# Patient Record
Sex: MALE | Race: WHITE | NOT HISPANIC OR LATINO | Employment: FULL TIME | URBAN - METROPOLITAN AREA
[De-identification: names, ages, dates, MRNs, and addresses within clinical notes are randomized per-mention and may not be internally consistent; named-entity substitution may affect disease eponyms.]

---

## 2017-06-26 PROBLEM — K21.9 GASTROESOPHAGEAL REFLUX DISEASE: Status: ACTIVE | Noted: 2017-06-26

## 2017-06-26 RX ORDER — SUMATRIPTAN 100 MG/1
100 TABLET, FILM COATED ORAL ONCE AS NEEDED
COMMUNITY
Start: 2015-01-08

## 2017-06-26 RX ORDER — FLUTICASONE PROPIONATE 50 MCG
1 SPRAY, SUSPENSION (ML) NASAL DAILY
COMMUNITY
Start: 2015-03-30 | End: 2018-03-14 | Stop reason: HOSPADM

## 2017-06-26 RX ORDER — ESOMEPRAZOLE MAGNESIUM 40 MG/1
40 CAPSULE, DELAYED RELEASE ORAL
COMMUNITY
Start: 2016-06-07 | End: 2018-03-14 | Stop reason: HOSPADM

## 2017-06-26 RX ORDER — IBUPROFEN 800 MG/1
800 TABLET ORAL
COMMUNITY
Start: 2015-01-08 | End: 2018-02-13

## 2018-02-13 ENCOUNTER — OFFICE VISIT (OUTPATIENT)
Dept: SURGERY | Facility: CLINIC | Age: 33
End: 2018-02-13

## 2018-02-13 VITALS
SYSTOLIC BLOOD PRESSURE: 140 MMHG | BODY MASS INDEX: 31.11 KG/M2 | OXYGEN SATURATION: 99 % | DIASTOLIC BLOOD PRESSURE: 86 MMHG | HEART RATE: 98 BPM | HEIGHT: 73 IN | TEMPERATURE: 98.2 F | WEIGHT: 234.7 LBS

## 2018-02-13 DIAGNOSIS — K62.5 RECTAL BLEEDING: Primary | ICD-10-CM

## 2018-02-13 DIAGNOSIS — K64.8 INTERNAL HEMORRHOIDS WITH COMPLICATION: ICD-10-CM

## 2018-02-13 PROCEDURE — 46600 DIAGNOSTIC ANOSCOPY SPX: CPT | Performed by: COLON & RECTAL SURGERY

## 2018-02-13 PROCEDURE — 99204 OFFICE O/P NEW MOD 45 MIN: CPT | Performed by: COLON & RECTAL SURGERY

## 2018-02-13 RX ORDER — ALPRAZOLAM 1 MG/1
1 TABLET, EXTENDED RELEASE ORAL 3 TIMES DAILY
COMMUNITY
Start: 2018-01-30

## 2018-02-13 NOTE — PROGRESS NOTES
Sameer De La Rosa is a 32 y.o. male who is seen for Rectal Bleeding (SEEN IN ER).      HPI:    Pt c/o bleeding hemorrhoids since 2016  Bleeding is getting worse: he has blood dripping in the toilet    He has been taking metamucil since high school for hard stools  If he takes 1 day off of metamucil, he has a flare of blood    This past weekend he doubled up metamucil to bid, and has not seen blood since last week    He does not take any stool softeners    He used hemorrhoid foam for 3 days, but he did not notice an improvement    He also c/o occasional LLQ abdominal cramping pain  If he pushes on abd, it makes him pas gas    His weight has fluctuated some recently, which he attributes to working out    He has never had a colonoscopy    FamHx: colon cancer MGF    He does heavy lifting in his job    Past Medical History:   Diagnosis Date   • Abdominal pain    • Acid reflux     INTERMITTENTLY   • Acute sinusitis    • Allergic rhinitis     seasonal   • Anxiety    • Asthmatic bronchitis    • Blood in stool    • 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   • Chest pain    • Chronic ethmoidal sinusitis 12/08/2015   • Chronic rhinitis 12/08/2015   • Deviated nasal septum 12/08/2015   • Dizziness 07/08/2014    SEEN AT [facility] ER   • Epididymitis, left 09/23/2013   • Fatigue    • Heart palpitations    • History of migraine     PRIOR TO SINUS SURGERY   • History of MRSA infection APPROX 2012    WOUND, LEG    • Hx of lead exposure 03/2015   • Insomnia    • Laceration of wrist, left 06/24/2013   • Lymphadenopathy, submental    • Nasal turbinate hypertrophy    • Palpitations    • Pneumonia due to organism    • Rectal bleeding 06/07/2016   • Sensorineural hearing loss 12/08/2015   • Severe epistaxis 01/11/2016    SEEN AT [facility] ER   • Shortness of breath    • TMJ (sprain of temporomandibular joint) 10/23/2012   • Tympanic membrane perforation     LEFT   • Wheezing        Past Surgical History:   Procedure Laterality Date    • APPENDECTOMY N/A 2011   • SEPTOPLASTY Bilateral 12/09/2015    VENITA SMR, INF. TURBINECTOMY, VENITA FESS, SINUSOTOMY DR. DERIC ARGUELLO   • TYMPANOPLASTY Left 5/11/2016    Procedure: LEFT TYMPANOPLASTY;  Surgeon: Deric Arguello MD;  Location: [facility] OR [facility] [place];  Service:        Social History:   reports that he has been smoking Cigarettes.  He has never used smokeless tobacco. He reports that he drinks about 18.0 oz of alcohol per week  He reports that he does not use illicit drugs.      Marriage status:     Family History   Problem Relation Age of Onset   • Colon cancer Maternal Grandfather    • No Known Problems Mother    • No Known Problems Father    • No Known Problems Daughter    • No Known Problems Daughter    • Hyperlipidemia Neg Hx    • Hypertension Neg Hx    • Heart disease Neg Hx    • Heart attack Neg Hx    • Heart failure Neg Hx    • Arrhythmia Neg Hx          Current Outpatient Prescriptions:   •  ALPRAZolam XR (XANAX XR) 1 MG 24 hr tablet, , Disp: , Rfl:   •  esomeprazole (nexIUM) 40 MG capsule, Take 40 mg by mouth., Disp: , Rfl:   •  fluticasone (FLONASE) 50 MCG/ACT nasal spray, 1 spray into each nostril., Disp: , Rfl:   •  psyllium (METAMUCIL) 0.52 G capsule, Take 0.52 g by mouth., Disp: , Rfl:   •  SUMAtriptan (IMITREX) 100 MG tablet, Take 100 mg by mouth., Disp: , Rfl:     Allergy  Review of patient's allergies indicates no known allergies.    Review of Systems   Constitution: Negative for decreased appetite, weakness and weight gain.   HENT: Negative for congestion, hearing loss and hoarse voice.    Eyes: Negative for blurred vision, discharge and visual disturbance.   Cardiovascular: Negative for chest pain, cyanosis and leg swelling.   Respiratory: Negative for cough, shortness of breath, sleep disturbances due to breathing and snoring.    Endocrine: Negative for cold intolerance and heat intolerance.   Hematologic/Lymphatic: Does not bruise/bleed easily.   Skin: Negative for itching, poor  wound healing and skin cancer.   Musculoskeletal: Negative for arthritis, back pain, joint pain and joint swelling.   Gastrointestinal: Positive for abdominal pain. Negative for change in bowel habit, bowel incontinence and constipation.   Genitourinary: Negative for bladder incontinence, dysuria and hematuria.   Neurological: Negative for brief paralysis, excessive daytime sleepiness, dizziness, focal weakness, headaches and light-headedness.   Psychiatric/Behavioral: Negative for altered mental status and hallucinations. The patient does not have insomnia.    Allergic/Immunologic: Negative for HIV exposure and persistent infections.   All other systems reviewed and are negative.      Vitals:    02/13/18 0953   BP: 140/86   Pulse: 98   Temp: 98.2 °F (36.8 °C)   SpO2: 99%     Body mass index is 30.96 kg/(m^2).    Physical Exam   Constitutional: He is oriented to person, place, and time. He appears well-developed and well-nourished. No distress.   HENT:   Head: Normocephalic and atraumatic.   Nose: Nose normal.   Mouth/Throat: Oropharynx is clear and moist.   Eyes: Conjunctivae and EOM are normal. Pupils are equal, round, and reactive to light.   Neck: Normal range of motion. No tracheal deviation present.   Pulmonary/Chest: Effort normal and breath sounds normal. No respiratory distress.   Abdominal: Soft. Bowel sounds are normal. He exhibits no distension.   Genitourinary:   Genitourinary Comments: Perianal exam: external hem - wnl  ANDRESSA- good tone, no masses  Anoscopy performed:  Grade 1 x 3 internal hem   Musculoskeletal: Normal range of motion. He exhibits no edema or deformity.   Neurological: He is alert and oriented to person, place, and time. No cranial nerve deficit. Coordination and gait normal.   Skin: Skin is warm and dry.   Psychiatric: He has a normal mood and affect. His behavior is normal. Judgment normal.       Review of Medical Record: PCP Melissa Horvath records: rectal bleeding  Assessment:  1.  Rectal bleeding    2. Internal hemorrhoids with complication        Plan:    For the rectal bleeding, I recommend colonoscopy to rule out major sources of bleeding other than hemorrhoids. At the time of colonoscopy if there are no serious issues found at that time, I recommend doing rubber band ligation of the enlarged internal hemorrhoids.  I described risk, benefits and alternatives to the patient.  I described to patient typical post procedure recovery, typical outcomes, and 85% success rate. The patient wishes to proceed.    I also recommended he continue his bid fiber regimen.  He can also use miralax as needed for hard stools.      Scribed for Prema Rosa MD by Dora Manjarrez PA-C 2/13/2018  This patient was evaluated by me, recommendations made, documentation reviewed, edited, and revised by me, Prema Rosa MD

## 2018-03-14 ENCOUNTER — ANESTHESIA EVENT (OUTPATIENT)
Dept: GASTROENTEROLOGY | Facility: HOSPITAL | Age: 33
End: 2018-03-14

## 2018-03-14 ENCOUNTER — HOSPITAL ENCOUNTER (OUTPATIENT)
Facility: HOSPITAL | Age: 33
Setting detail: HOSPITAL OUTPATIENT SURGERY
Discharge: HOME | End: 2018-03-14
Attending: COLON & RECTAL SURGERY | Admitting: COLON & RECTAL SURGERY

## 2018-03-14 ENCOUNTER — ANESTHESIA (OUTPATIENT)
Dept: GASTROENTEROLOGY | Facility: HOSPITAL | Age: 33
End: 2018-03-14

## 2018-03-14 VITALS
TEMPERATURE: 97.9 F | HEART RATE: 69 BPM | OXYGEN SATURATION: 98 % | DIASTOLIC BLOOD PRESSURE: 83 MMHG | BODY MASS INDEX: 29.89 KG/M2 | RESPIRATION RATE: 16 BRPM | HEIGHT: 73 IN | SYSTOLIC BLOOD PRESSURE: 129 MMHG | WEIGHT: 225.56 LBS

## 2018-03-14 DIAGNOSIS — K62.5 RECTAL BLEEDING: ICD-10-CM

## 2018-03-14 PROCEDURE — 25010000002 PROPOFOL 10 MG/ML EMULSION: Performed by: ANESTHESIOLOGY

## 2018-03-14 PROCEDURE — 45378 DIAGNOSTIC COLONOSCOPY: CPT | Performed by: COLON & RECTAL SURGERY

## 2018-03-14 RX ORDER — PROPOFOL 10 MG/ML
VIAL (ML) INTRAVENOUS CONTINUOUS PRN
Status: DISCONTINUED | OUTPATIENT
Start: 2018-03-14 | End: 2018-03-14 | Stop reason: SURG

## 2018-03-14 RX ORDER — SODIUM CHLORIDE, SODIUM LACTATE, POTASSIUM CHLORIDE, CALCIUM CHLORIDE 600; 310; 30; 20 MG/100ML; MG/100ML; MG/100ML; MG/100ML
30 INJECTION, SOLUTION INTRAVENOUS CONTINUOUS PRN
Status: DISCONTINUED | OUTPATIENT
Start: 2018-03-14 | End: 2018-03-14 | Stop reason: HOSPADM

## 2018-03-14 RX ORDER — PROPOFOL 10 MG/ML
VIAL (ML) INTRAVENOUS AS NEEDED
Status: DISCONTINUED | OUTPATIENT
Start: 2018-03-14 | End: 2018-03-14 | Stop reason: SURG

## 2018-03-14 RX ORDER — LIDOCAINE HYDROCHLORIDE 20 MG/ML
INJECTION, SOLUTION INFILTRATION; PERINEURAL AS NEEDED
Status: DISCONTINUED | OUTPATIENT
Start: 2018-03-14 | End: 2018-03-14 | Stop reason: SURG

## 2018-03-14 RX ADMIN — PROPOFOL 50 MG: 10 INJECTION, EMULSION INTRAVENOUS at 07:40

## 2018-03-14 RX ADMIN — LIDOCAINE HYDROCHLORIDE 60 MG: 20 INJECTION, SOLUTION INFILTRATION; PERINEURAL at 07:33

## 2018-03-14 RX ADMIN — PROPOFOL 100 MG: 10 INJECTION, EMULSION INTRAVENOUS at 07:35

## 2018-03-14 RX ADMIN — PROPOFOL 100 MCG/KG/MIN: 10 INJECTION, EMULSION INTRAVENOUS at 07:30

## 2018-03-14 RX ADMIN — PROPOFOL 100 MG: 10 INJECTION, EMULSION INTRAVENOUS at 07:30

## 2018-03-14 RX ADMIN — SODIUM CHLORIDE, POTASSIUM CHLORIDE, SODIUM LACTATE AND CALCIUM CHLORIDE: 600; 310; 30; 20 INJECTION, SOLUTION INTRAVENOUS at 07:30

## 2018-03-14 NOTE — ANESTHESIA POSTPROCEDURE EVALUATION
"Patient: Sameer De La Rosa    Procedure Summary     Date:  03/14/18 Room / Location:  [facility] ENDOSCOPY 9 /  [facility]    Anesthesia Start:  0733 Anesthesia Stop:  0756    Procedure:  COLONOSCOPY to cecum and t.i. (N/A ) Diagnosis:       Rectal bleeding      (Rectal bleeding [K62.5])    Surgeon:  Prema Rosa MD Provider:  Herb Hernandez MD    Anesthesia Type:  MAC ASA Status:  2          Anesthesia Type: MAC  Last vitals  BP   114/70 (03/14/18 0805)   Temp   36.6 °C (97.9 °F) (03/14/18 0805)   Pulse   71 (03/14/18 0805)   Resp   16 (03/14/18 0713)     SpO2   97 % (03/14/18 0805)     Post Anesthesia Care and Evaluation    Patient location during evaluation: PACU  Patient participation: complete - patient participated  Level of consciousness: awake  Pain score: 0  Pain management: adequate  Airway patency: patent  Anesthetic complications: No anesthetic complications  PONV Status: none  Cardiovascular status: acceptable  Respiratory status: acceptable  Hydration status: acceptable    Comments: /70 (BP Location: Left arm, Patient Position: Lying)   Pulse 71   Temp 36.6 °C (97.9 °F) (Oral)   Resp 16   Ht 185.4 cm (73\")   Wt 102 kg (225 lb 9 oz)   SpO2 97%   BMI 29.76 kg/m²       "

## 2018-03-14 NOTE — H&P (VIEW-ONLY)
Sameer De La Rosa is a 32 y.o. male who is seen for Rectal Bleeding (SEEN IN ER).      HPI:    Pt c/o bleeding hemorrhoids since 2016  Bleeding is getting worse: he has blood dripping in the toilet    He has been taking metamucil since high school for hard stools  If he takes 1 day off of metamucil, he has a flare of blood    This past weekend he doubled up metamucil to bid, and has not seen blood since last week    He does not take any stool softeners    He used hemorrhoid foam for 3 days, but he did not notice an improvement    He also c/o occasional LLQ abdominal cramping pain  If he pushes on abd, it makes him pas gas    His weight has fluctuated some recently, which he attributes to working out    He has never had a colonoscopy    FamHx: colon cancer MGF    He does heavy lifting in his job    Past Medical History:   Diagnosis Date   • Abdominal pain    • Acid reflux     INTERMITTENTLY   • Acute sinusitis    • Allergic rhinitis     seasonal   • Anxiety    • Asthmatic bronchitis    • Blood in stool    • 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   • Chest pain    • Chronic ethmoidal sinusitis 12/08/2015   • Chronic rhinitis 12/08/2015   • Deviated nasal septum 12/08/2015   • Dizziness 07/08/2014    SEEN AT [facility] ER   • Epididymitis, left 09/23/2013   • Fatigue    • Heart palpitations    • History of migraine     PRIOR TO SINUS SURGERY   • History of MRSA infection APPROX 2012    WOUND, LEG    • Hx of lead exposure 03/2015   • Insomnia    • Laceration of wrist, left 06/24/2013   • Lymphadenopathy, submental    • Nasal turbinate hypertrophy    • Palpitations    • Pneumonia due to organism    • Rectal bleeding 06/07/2016   • Sensorineural hearing loss 12/08/2015   • Severe epistaxis 01/11/2016    SEEN AT [facility] ER   • Shortness of breath    • TMJ (sprain of temporomandibular joint) 10/23/2012   • Tympanic membrane perforation     LEFT   • Wheezing        Past Surgical History:   Procedure Laterality Date    • APPENDECTOMY N/A 2011   • SEPTOPLASTY Bilateral 12/09/2015    VENITA SMR, INF. TURBINECTOMY, VENITA FESS, SINUSOTOMY DR. DERIC ARGUELLO   • TYMPANOPLASTY Left 5/11/2016    Procedure: LEFT TYMPANOPLASTY;  Surgeon: Deric Arguello MD;  Location: [facility] OR [facility] [place];  Service:        Social History:   reports that he has been smoking Cigarettes.  He has never used smokeless tobacco. He reports that he drinks about 18.0 oz of alcohol per week  He reports that he does not use illicit drugs.      Marriage status:     Family History   Problem Relation Age of Onset   • Colon cancer Maternal Grandfather    • No Known Problems Mother    • No Known Problems Father    • No Known Problems Daughter    • No Known Problems Daughter    • Hyperlipidemia Neg Hx    • Hypertension Neg Hx    • Heart disease Neg Hx    • Heart attack Neg Hx    • Heart failure Neg Hx    • Arrhythmia Neg Hx          Current Outpatient Prescriptions:   •  ALPRAZolam XR (XANAX XR) 1 MG 24 hr tablet, , Disp: , Rfl:   •  esomeprazole (nexIUM) 40 MG capsule, Take 40 mg by mouth., Disp: , Rfl:   •  fluticasone (FLONASE) 50 MCG/ACT nasal spray, 1 spray into each nostril., Disp: , Rfl:   •  psyllium (METAMUCIL) 0.52 G capsule, Take 0.52 g by mouth., Disp: , Rfl:   •  SUMAtriptan (IMITREX) 100 MG tablet, Take 100 mg by mouth., Disp: , Rfl:     Allergy  Review of patient's allergies indicates no known allergies.    Review of Systems   Constitution: Negative for decreased appetite, weakness and weight gain.   HENT: Negative for congestion, hearing loss and hoarse voice.    Eyes: Negative for blurred vision, discharge and visual disturbance.   Cardiovascular: Negative for chest pain, cyanosis and leg swelling.   Respiratory: Negative for cough, shortness of breath, sleep disturbances due to breathing and snoring.    Endocrine: Negative for cold intolerance and heat intolerance.   Hematologic/Lymphatic: Does not bruise/bleed easily.   Skin: Negative for itching, poor  wound healing and skin cancer.   Musculoskeletal: Negative for arthritis, back pain, joint pain and joint swelling.   Gastrointestinal: Positive for abdominal pain. Negative for change in bowel habit, bowel incontinence and constipation.   Genitourinary: Negative for bladder incontinence, dysuria and hematuria.   Neurological: Negative for brief paralysis, excessive daytime sleepiness, dizziness, focal weakness, headaches and light-headedness.   Psychiatric/Behavioral: Negative for altered mental status and hallucinations. The patient does not have insomnia.    Allergic/Immunologic: Negative for HIV exposure and persistent infections.   All other systems reviewed and are negative.      Vitals:    02/13/18 0953   BP: 140/86   Pulse: 98   Temp: 98.2 °F (36.8 °C)   SpO2: 99%     Body mass index is 30.96 kg/(m^2).    Physical Exam   Constitutional: He is oriented to person, place, and time. He appears well-developed and well-nourished. No distress.   HENT:   Head: Normocephalic and atraumatic.   Nose: Nose normal.   Mouth/Throat: Oropharynx is clear and moist.   Eyes: Conjunctivae and EOM are normal. Pupils are equal, round, and reactive to light.   Neck: Normal range of motion. No tracheal deviation present.   Pulmonary/Chest: Effort normal and breath sounds normal. No respiratory distress.   Abdominal: Soft. Bowel sounds are normal. He exhibits no distension.   Genitourinary:   Genitourinary Comments: Perianal exam: external hem - wnl  ANDRESSA- good tone, no masses  Anoscopy performed:  Grade 1 x 3 internal hem   Musculoskeletal: Normal range of motion. He exhibits no edema or deformity.   Neurological: He is alert and oriented to person, place, and time. No cranial nerve deficit. Coordination and gait normal.   Skin: Skin is warm and dry.   Psychiatric: He has a normal mood and affect. His behavior is normal. Judgment normal.       Review of Medical Record: PCP Melissa Horvath records: rectal bleeding  Assessment:  1.  Rectal bleeding    2. Internal hemorrhoids with complication        Plan:    For the rectal bleeding, I recommend colonoscopy to rule out major sources of bleeding other than hemorrhoids. At the time of colonoscopy if there are no serious issues found at that time, I recommend doing rubber band ligation of the enlarged internal hemorrhoids.  I described risk, benefits and alternatives to the patient.  I described to patient typical post procedure recovery, typical outcomes, and 85% success rate. The patient wishes to proceed.    I also recommended he continue his bid fiber regimen.  He can also use miralax as needed for hard stools.      Scribed for Prema Rosa MD by Dora Manjarrez PA-C 2/13/2018  This patient was evaluated by me, recommendations made, documentation reviewed, edited, and revised by me, Prema Rosa MD

## 2024-12-18 ENCOUNTER — APPOINTMENT (OUTPATIENT)
Dept: GENERAL RADIOLOGY | Facility: HOSPITAL | Age: 39
End: 2024-12-18
Payer: COMMERCIAL

## 2024-12-18 ENCOUNTER — HOSPITAL ENCOUNTER (EMERGENCY)
Facility: HOSPITAL | Age: 39
Discharge: HOME | End: 2024-12-18
Attending: EMERGENCY MEDICINE | Admitting: EMERGENCY MEDICINE
Payer: COMMERCIAL

## 2024-12-18 VITALS
BODY MASS INDEX: 30.48 KG/M2 | HEIGHT: 73 IN | TEMPERATURE: 97.5 F | WEIGHT: 230 LBS | OXYGEN SATURATION: 97 % | SYSTOLIC BLOOD PRESSURE: 143 MMHG | RESPIRATION RATE: 16 BRPM | DIASTOLIC BLOOD PRESSURE: 81 MMHG | HEART RATE: 80 BPM

## 2024-12-18 DIAGNOSIS — S39.012A ACUTE MYOFASCIAL STRAIN OF LUMBAR REGION, INITIAL ENCOUNTER: Primary | ICD-10-CM

## 2024-12-18 PROCEDURE — 96372 THER/PROPH/DIAG INJ SC/IM: CPT

## 2024-12-18 PROCEDURE — 99283 EMERGENCY DEPT VISIT LOW MDM: CPT

## 2024-12-18 PROCEDURE — 72110 X-RAY EXAM L-2 SPINE 4/>VWS: CPT

## 2024-12-18 PROCEDURE — 25010000002 DEXAMETHASONE SODIUM PHOSPHATE 10 MG/ML SOLUTION: Performed by: PHYSICIAN ASSISTANT

## 2024-12-18 RX ORDER — DEXAMETHASONE SODIUM PHOSPHATE 10 MG/ML
10 INJECTION, SOLUTION INTRAMUSCULAR; INTRAVENOUS ONCE
Status: COMPLETED | OUTPATIENT
Start: 2024-12-18 | End: 2024-12-18

## 2024-12-18 RX ORDER — ACETAMINOPHEN 500 MG
1000 TABLET ORAL ONCE
Status: COMPLETED | OUTPATIENT
Start: 2024-12-18 | End: 2024-12-18

## 2024-12-18 RX ORDER — METHOCARBAMOL 750 MG/1
750 TABLET, FILM COATED ORAL 3 TIMES DAILY PRN
Qty: 15 TABLET | Refills: 0 | Status: SHIPPED | OUTPATIENT
Start: 2024-12-18

## 2024-12-18 RX ORDER — NAPROXEN 500 MG/1
500 TABLET ORAL 2 TIMES DAILY PRN
Qty: 15 TABLET | Refills: 0 | Status: SHIPPED | OUTPATIENT
Start: 2024-12-18

## 2024-12-18 RX ORDER — METHOCARBAMOL 750 MG/1
750 TABLET, FILM COATED ORAL ONCE
Status: COMPLETED | OUTPATIENT
Start: 2024-12-18 | End: 2024-12-18

## 2024-12-18 RX ORDER — LIDOCAINE 4 G/G
1 PATCH TOPICAL EVERY 24 HOURS
Qty: 6 PATCH | Refills: 0 | Status: SHIPPED | OUTPATIENT
Start: 2024-12-18

## 2024-12-18 RX ORDER — LIDOCAINE 4 G/G
1 PATCH TOPICAL ONCE
Status: DISCONTINUED | OUTPATIENT
Start: 2024-12-18 | End: 2024-12-18 | Stop reason: HOSPADM

## 2024-12-18 RX ADMIN — ACETAMINOPHEN 1000 MG: 500 TABLET, FILM COATED ORAL at 10:36

## 2024-12-18 RX ADMIN — DEXAMETHASONE SODIUM PHOSPHATE 10 MG: 10 INJECTION INTRAMUSCULAR; INTRAVENOUS at 10:39

## 2024-12-18 RX ADMIN — LIDOCAINE 1 PATCH: 4 PATCH TOPICAL at 10:37

## 2024-12-18 RX ADMIN — METHOCARBAMOL 750 MG: 750 TABLET ORAL at 10:37

## 2024-12-18 NOTE — ED PROVIDER NOTES
" EMERGENCY DEPARTMENT ENCOUNTER      Room Number:  07/07  PCP: Shamir Glass DO  Independent Historians: Patient  Patient Care Team:  Shamir Glass DO as PCP - General (Internal Medicine)  Melissa Horvath APRN (Inactive) as PCP - Family Medicine       HPI:  Chief Complaint: Back pain    A complete HPI/ROS/PMH/PSH/SH/FH are unobtainable due to: None    Chronic or social conditions impacting patient care (Social Determinants of Health): None      Context: Sameer De La Rosa is a 39 y.o. male with a PMH significant for anxiety, panic disorder, ADHD, migraines, insomnia who presents to the ED c/o acute back pain.  Over the past couple of weeks the patient has noticed bilateral aching discomfort of the low back that is primarily present with changing positions from sitting to standing or vice versa.  He does heavy lifting at work on a daily basis and reports \"I think have been overdoing it\".  He initially was having radiation of symptoms into his left buttocks and left leg but after several days of prednisone therapy from his PCP though symptoms have resolved.  He continues to have pain in the low back that is aching and nonradiating at this time.  No fever, chills.  No bowel or bladder incontinence, saddle paresthesias.      Upon review of prior external notes (non-ED) -and- Review of prior external test results outside of this encounter it appears the patient was evaluated in the office with internal medicine for anxiety on 6/13/2024.  The patient had a normal PSA and TSH on 3/14/2024.      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    Chronic ethmoidal sinusitis 12/08/2015    Chronic rhinitis 12/08/2015    Deviated nasal septum 12/08/2015    Dizziness 07/08/2014    Epididymitis, left 09/23/2013    Fatigue     Heart palpitations     History of migraine     History of MRSA infection APPROX 2012    Hx of lead exposure 03/2015    Insomnia     Laceration of wrist, left 06/24/2013    Lymphadenopathy, submental     Nasal turbinate hypertrophy     PAC (premature atrial contraction)     Pneumonia due to organism     Sensorineural hearing loss 12/08/2015    Severe epistaxis 01/11/2016    Shortness of breath     TMJ (sprain of temporomandibular joint) 10/23/2012    Tympanic membrane perforation     Wheezing          PAST SURGICAL HISTORY  Past Surgical History:   Procedure Laterality Date    APPENDECTOMY N/A 2011    COLONOSCOPY N/A 3/14/2018    Procedure: COLONOSCOPY to cecum and t.i.;  Surgeon: Prema Rosa MD;  Location: [facility] ENDOSCOPY;  Service: Gastroenterology    SEPTOPLASTY Bilateral 12/09/2015    VENITA SMR, INF. TURBINECTOMY, VENITA FESS, SINUSOTOMY DR. DERIC ARGUELLO    TYMPANOPLASTY Left 5/11/2016    Procedure: LEFT TYMPANOPLASTY;  Surgeon: Deric Arguello MD;  [place] [facility] [place] [facility] [place]  Service:          FAMILY HISTORY  Family History   Problem Relation Age of Onset    Colon cancer Maternal Grandfather     No Known Problems Mother     No Known Problems Father     No Known Problems Daughter     No Known Problems Daughter     Hyperlipidemia Neg Hx     Hypertension Neg Hx     Heart disease Neg Hx     Heart attack Neg Hx     Heart failure Neg Hx     Arrhythmia Neg Hx     Malig Hyperthermia Neg Hx          SOCIAL HISTORY  Social History     Socioeconomic History    Marital status:    Tobacco Use    Smoking status: Never    Smokeless tobacco: Current     Types: Snuff   Substance and Sexual Activity    Alcohol use: Yes      Alcohol/week: 30.0 standard drinks of alcohol     Types: 30 Cans of beer per week     Comment: DAILY    Drug use: No    Sexual activity: Defer         ALLERGIES  Patient has no known allergies.      REVIEW OF SYSTEMS  Included in HPI  All systems reviewed and negative except for those discussed in HPI.      PHYSICAL EXAM    I have reviewed the triage vital signs and nursing notes.    ED Triage Vitals   Temp Pulse Resp BP SpO2   -- -- -- -- --      Temp src Heart Rate Source Patient Position BP Location FiO2 (%)   -- -- -- -- --       Physical Exam  Constitutional:       General: He is not in acute distress.     Appearance: He is well-developed.   HENT:      Head: Normocephalic and atraumatic.   Eyes:      General: No scleral icterus.     Conjunctiva/sclera: Conjunctivae normal.   Neck:      Trachea: No tracheal deviation.   Cardiovascular:      Rate and Rhythm: Normal rate and regular rhythm.      Pulses:           Dorsalis pedis pulses are 2+ on the right side and 2+ on the left side.   Pulmonary:      Effort: Pulmonary effort is normal.      Breath sounds: Normal breath sounds.   Abdominal:      Palpations: Abdomen is soft.      Tenderness: There is no abdominal tenderness. There is no guarding.   Musculoskeletal:         General: No deformity.      Cervical back: Normal range of motion.      Lumbar back: No bony tenderness. Normal range of motion.   Lymphadenopathy:      Cervical: No cervical adenopathy.   Skin:     General: Skin is warm and dry.   Neurological:      Mental Status: He is alert and oriented to person, place, and time.   Psychiatric:         Behavior: Behavior normal.         Vital signs and nursing notes reviewed.      PPE: I wore a surgical mask throughout my encounters with the pt. I performed hand hygiene on entry into the pt room and upon exit.     LAB RESULTS  No results found for this or any previous visit (from the past 24 hours).      RADIOLOGY  XR Spine Lumbar Complete 4+VW    Result  Date: 12/18/2024  XR SPINE LUMBAR COMPLETE 4+VW-  INDICATIONS: Pain.  TECHNIQUE: 5 VIEWS OF THE LUMBAR SPINE  COMPARISON: None available  FINDINGS:  Mild anterolisthesis of L5 on S1. Alignment is otherwise in range of normal. Vertebral body heights appear preserved. No acute fracture is identified. Disc space narrowing is seen at L5/S1. If further imaging evaluation is indicated, MRI could be considered.       As described.    This report was finalized on 12/18/2024 11:04 AM by Dr. Robbin Osuna M.D on Workstation: [id number]         MEDICATIONS GIVEN IN ER  Medications   Lidocaine 4 % 1 patch (1 patch Transdermal Medication Applied 12/18/24 1037)   dexAMETHasone sodium phosphate injection 10 mg (10 mg Intramuscular Given 12/18/24 1039)   methocarbamol (ROBAXIN) tablet 750 mg (750 mg Oral Given 12/18/24 1037)   acetaminophen (TYLENOL) tablet 1,000 mg (1,000 mg Oral Given 12/18/24 1036)         ORDERS PLACED DURING THIS VISIT:  Orders Placed This Encounter   Procedures    XR Spine Lumbar Complete 4+VW         OUTPATIENT MEDICATION MANAGEMENT:  Current Facility-Administered Medications Ordered in Epic   Medication Dose Route Frequency Provider Last Rate Last Admin    Lidocaine 4 % 1 patch  1 patch Transdermal Once Herb Ambrosio PA   1 patch at 12/18/24 1037     Current Outpatient Medications Ordered in Epic   Medication Sig Dispense Refill    ALPRAZolam (XANAX) 0.5 MG tablet Take 1 tablet by mouth 3 (Three) Times a Day As Needed for Anxiety prior to flying as needed 10 tablet 0    ALPRAZolam (XANAX) 0.5 MG tablet Take 1 tablet by mouth Daily for 15 days. Must last 30 days 15 tablet 0    ALPRAZolam XR (XANAX XR) 1 MG 24 hr tablet Take 1 mg by mouth 3 (Three) Times a Day.      ALPRAZolam XR (XANAX XR) 1 MG 24 hr tablet Take 2 tablets by mouth Every Morning. 60 tablet 0    ALPRAZolam XR (XANAX XR) 1 MG 24 hr tablet Take 2 tablets by mouth Every Morning. 60 tablet 0    ALPRAZolam XR (XANAX XR) 1 MG 24 hr tablet Take  2 tablets by mouth every morning. 60 tablet 0    amoxicillin-clavulanate (AUGMENTIN) 875-125 MG per tablet Take 1 tablet by mouth 2 (Two) Times a Day. 28 tablet 0    amphetamine-dextroamphetamine XR (ADDERALL XR) 20 MG 24 hr capsule Take 1 capsule by mouth Every Morning. 30 capsule 0    amphetamine-dextroamphetamine XR (ADDERALL XR) 30 MG 24 hr capsule Take 1 capsule by mouth every morning.  Max Daily Amount: 30 mg 30 capsule 0    amphetamine-dextroamphetamine XR (ADDERALL XR) 30 MG 24 hr capsule Take 1 capsule by mouth Every Morning 30 capsule 0    amphetamine-dextroamphetamine XR (ADDERALL XR) 30 MG 24 hr capsule Take 1 capsule by mouth Every Morning 30 capsule 0    amphetamine-dextroamphetamine XR (ADDERALL XR) 30 MG 24 hr capsule Take 1 capsule by mouth Every Morning 30 capsule 0    amphetamine-dextroamphetamine XR (ADDERALL XR) 30 MG 24 hr capsule Take 1 capsule by mouth Every Morning 30 capsule 0    azelastine (ASTELIN) 0.1 % nasal spray Administer 2 sprays into both nostrils as directed by provider Every Morning. 30 mL 6    busPIRone (BUSPAR) 10 MG tablet Take 1 tablet by mouth 2 (Two) Times a Day. 60 tablet 0    desvenlafaxine (PRISTIQ) 100 MG 24 hr tablet Take 1 tablet by mouth daily. 90 tablet 3    desvenlafaxine (PRISTIQ) 100 MG 24 hr tablet Take 1 tablet by mouth daily. 90 tablet 0    desvenlafaxine (PRISTIQ) 100 MG 24 hr tablet Take 1 tablet by mouth Daily. 90 tablet 0    desvenlafaxine (PRISTIQ) 100 MG 24 hr tablet Take 1 tablet by mouth daily. 90 tablet 0    desvenlafaxine (PRISTIQ) 100 MG 24 hr tablet Take 1 tablet by mouth daily. 90 tablet 1    desvenlafaxine (PRISTIQ) 100 MG 24 hr tablet Take 1 tablet by mouth daily. 90 tablet 1    desvenlafaxine (PRISTIQ) 50 MG 24 hr tablet Take 1 tablet by mouth daily. 90 tablet 3    desvenlafaxine (PRISTIQ) 50 MG 24 hr tablet Take 1 tablet by mouth Daily. 90 tablet 3    desvenlafaxine (PRISTIQ) 50 MG 24 hr tablet Take 1 tablet by mouth Daily. 90 tablet 3     desvenlafaxine (PRISTIQ) 50 MG 24 hr tablet Take 1 tablet by mouth daily. 90 tablet 3    desvenlafaxine (PRISTIQ) 50 MG 24 hr tablet Take 1 tablet by mouth Daily. 90 tablet 3    Desvenlafaxine Succinate ER 25 MG tablet sustained-release 24 hour Take 3 tablets by mouth Daily. 90 tablet 3    Desvenlafaxine Succinate ER 25 MG tablet sustained-release 24 hour Take 3 tablets by mouth Daily. 30 tablet 3    Desvenlafaxine Succinate ER 25 MG tablet sustained-release 24 hour Take 3 tablets by mouth daily. 30 tablet 3    doxepin (SINEquan) 10 MG capsule Take 1 capsule by mouth nightly. 30 capsule 0    doxepin (SINEquan) 10 MG capsule Take 1 capsule by mouth nightly. 90 capsule 0    EPINEPHrine (EPIPEN) 0.3 MG/0.3ML solution auto-injector injection Inject 0.3 mLs into the muscle once as needed for Anaphylaxis - may repeat x 1 dose if needed.. 2 each 1    etodolac (LODINE) 400 MG tablet Take 1 tablet by mouth 2 (Two) Times a Day. 60 tablet 0    fluticasone (FLONASE) 50 MCG/ACT nasal spray 1 spray into the nostril(s) as directed by provider Daily. 16 g 3    fluticasone (FLONASE) 50 MCG/ACT nasal spray Instill 1 spray into nose daily. 16 g 3    fluticasone (FLONASE) 50 MCG/ACT nasal spray Instill 1 spray into each nostril as directed by provider Daily. 16 g 3    fluticasone (FLONASE) 50 MCG/ACT nasal spray Instill 1 spray into nose daily. 16 g 3    fluticasone (FLONASE) 50 MCG/ACT nasal spray Instill 1 spray into nose daily. 16 g 3    fluticasone (FLONASE) 50 MCG/ACT nasal spray Instill 1 spray into each nostril once daily. 16 g 3    fluticasone (FLONASE) 50 MCG/ACT nasal spray Instill 1 spray into nose daily. 16 g 3    fluticasone (FLONASE) 50 MCG/ACT nasal spray Instill 1 spray into nose daily. 16 g 3    fluticasone (FLONASE) 50 MCG/ACT nasal spray Instill 1 spray into the nose Daily. 16 g 3    hydrOXYzine (ATARAX) 10 MG tablet Take 1 tablet by mouth Every 8 (Eight) Hours As Needed for itching or anxiety 30 tablet 0     hydrOXYzine (ATARAX) 10 MG tablet Take 1 tablet by mouth every 8 (eight) hours as needed for Anxiety. 30 tablet 0    hydrOXYzine (ATARAX) 10 MG tablet Take 1 tablet by mouth Every 8 (Eight) Hours As Needed for anxiety. 30 tablet 0    hydrOXYzine (ATARAX) 10 MG tablet Take 1 tablet by mouth every 8 (eight) hours as needed for Anxiety. 30 tablet 0    hydrOXYzine (ATARAX) 10 MG tablet Take 1 tablet by mouth every 8 (eight) hours as needed for Anxiety. 30 tablet 0    hydrOXYzine (ATARAX) 10 MG tablet Take 1 tablet by mouth every 8 (eight) hours as needed for Anxiety. 30 tablet 0    hydrOXYzine (ATARAX) 10 MG tablet Take 1 tablet by mouth every 8 (eight) hours as needed for Anxiety. 30 tablet 0    levocetirizine (XYZAL) 5 MG tablet Take 1 tablet by mouth every evening. 30 tablet 0    lidocaine (LIDODERM) 5 % Place 1 patch on the skin as directed by provider Daily. Remove & Discard patch within 12 hours or as directed by MD 6 patch 0    methocarbamol (ROBAXIN) 750 MG tablet Take 1 tablet by mouth 3 (Three) Times a Day As Needed for Muscle Spasms. 15 tablet 0    naproxen (NAPROSYN) 500 MG tablet Take 1 tablet by mouth 2 (Two) Times a Day As Needed for Mild Pain. 15 tablet 0    pantoprazole (PROTONIX) 40 MG EC tablet Take 1 tablet by mouth daily. 90 tablet 3    pantoprazole (PROTONIX) 40 MG EC tablet Take 1 tablet by mouth daily. 90 tablet 0    propranolol (INDERAL) 10 MG tablet Take 1 tablet by mouth 2 (two) times daily as needed (anxiety). 30 tablet 0    psyllium (METAMUCIL) 0.52 G capsule Take 0.52 g by mouth Daily.      sulfamethoxazole-trimethoprim (BACTRIM DS,SEPTRA DS) 800-160 MG per tablet Take 1 tablet by mouth 2 (Two) Times a Day. 14 tablet 0    SUMAtriptan (IMITREX) 100 MG tablet Take 100 mg by mouth 1 (One) Time As Needed.      Testosterone 20.25 MG/ACT (1.62%) gel Apply 2 pumps topically to the appropriate area as directed Daily. 75 g 0    Testosterone 20.25 MG/ACT (1.62%) gel Apply 1 pump actuation onto the  skin daily.  Max Daily Amount: 20.25 mg 75 g 0    Testosterone 20.25 MG/ACT (1.62%) gel Apply 1 pump actuation onto the skin daily.  Max Daily Amount: 20.25 mg 75 g 0    Testosterone 20.25 MG/ACT (1.62%) gel Apply 1 pump actuation onto the skin daily.  Max Daily Amount: 20.25 mg 75 g 0    traZODone (DESYREL) 50 MG tablet Take 1 tablet by mouth nightly. 90 tablet 3    traZODone (DESYREL) 50 MG tablet Take 1 tablet by mouth nightly. 90 tablet 3    Wegovy 0.25 MG/0.5ML solution auto-injector Inject 0.25 mg under the skin into the appropriate area as directed 1 (One) Time Per Week. 2 mL 0              PROGRESS, DATA ANALYSIS, CONSULTS, AND MEDICAL DECISION MAKING  All labs have been independently interpreted by me.  All radiology studies have been reviewed by me. All EKG's have been independently viewed and interpreted by me.  Discussion below represents my analysis of pertinent findings related to patient's condition, differential diagnosis, treatment plan and final disposition.      DIFFERENTIAL DIAGNOSIS INCLUDE BUT NOT LIMITED TO:     Differential diagnosis includes but is not limited to:    - Lumbar strain  - Lumbar radiculopathy  - Lumbar disc bulge/herniation  - Lumbar spinal stenosis  - Vertebral fracture  - Cauda equina syndrome  - Vertebral osteomyelitis  - Kidney stone  - Shingles    Clinical Scores: N/A      ED Course as of 12/18/24 1143   Wed Dec 18, 2024   1141 Patient presentation and evaluation consistent with muscular low back pain.  No worrisome findings on today's evaluation including plain film imaging and physical exam to indicate the need for admission or further workup.  No worrisome deficits to indicate the need for neurologic consultation or neurosurgical consultation emergently.  Appropriate for outpatient management supportive care and close follow-up with PCP.  Patient agreeable with all questions answered. [DC]      ED Course User Index  [DC] Herb Ambrosio, PA         1144 I rechecked the  patient.  I discussed the patient's radiology findings (including all incidental findings), diagnosis, and plan for discharge.  A repeat exam reveals no new worrisome changes from my initial exam findings.  The patient understands that the fact that they are being discharged does not denote that nothing is abnormal, it indicates that no clinical emergency is present and that they must follow-up as directed in order to properly maintain their health.  Follow-up instructions (specifically listed below) and return to ER precautions were given at this time.  I specifically instructed the patient to follow-up with their PCP.  The patient understands and agrees with the plan, and is ready for discharge.  All questions answered.         AS OF 11:43 EST VITALS:    BP - 143/81  HR - 80  TEMP - 97.5 °F (36.4 °C)  O2 SATS - 97%    COMPLEXITY OF CARE  Admission was considered but after careful review of the patient's presentation, physical examination, diagnostic results, and response to treatment the patient may be safely discharged with outpatient follow-up.      DIAGNOSIS  Final diagnoses:   Acute myofascial strain of lumbar region, initial encounter         DISPOSITION  ED Disposition       ED Disposition   Discharge    Condition   Stable    Comment   --                Please note that portions of this document were completed with a voice recognition program.    Note Disclaimer: At [facility], we believe that sharing information builds trust and better relationships. You are receiving this note because you recently visited [facility]. It is possible you will see health information before a provider has talked with you about it. This kind of information can be easy to misunderstand. To help you fully understand what it means for your health, we urge you to discuss this note with your provider.         Herb Ambrosio PA  12/18/24 114

## 2024-12-19 NOTE — ED PROVIDER NOTES
MD ATTESTATION NOTE    SHARED VISIT: This visit was performed by BOTH a physician and an APC. The substantive portion of the medical decision making was performed by this attesting physician who made or approved the management plan and takes responsibility for patient management. All studies documented in the APC note (if performed) were independently interpreted by me.    The TAYLOR and I have discussed this patient's history, physical exam, MDM, and treatment plan.  I have reviewed the documentation and personally had a face to face interaction with the patient. The attached note describes my personal findings.      Sameer De La Rosa is a 39 y.o. male who presents to the ED c/o acute low back pain over the past 2 weeks.     Patient denies any recent trauma to back. No fevers. No history of illicit IV drug use. No chronic steroid use. No anticoagulants. No history of malignancy. No saddle anesthesia or bowel or bladder changes.       On exam:  GENERAL: not distressed  HENT: nares patent  EYES: no scleral icterus  CV: regular rhythm, regular rate  RESPIRATORY: normal effort  ABDOMEN: soft  MUSCULOSKELETAL: no deformity  NEURO: alert, moves all extremities, follows commands  5/5 strength to hip flexion, knee extension/flexion, dorsiflexion, plantarflexion, and EHL  SILT at bilateral superficial peroneal, deep peroneal, sural, and saphenous nerves    SKIN: warm, dry    Labs  No results found for this or any previous visit (from the past 24 hours).    Radiology  XR Spine Lumbar Complete 4+VW    Result Date: 12/18/2024  XR SPINE LUMBAR COMPLETE 4+VW-  INDICATIONS: Pain.  TECHNIQUE: 5 VIEWS OF THE LUMBAR SPINE  COMPARISON: None available  FINDINGS:  Mild anterolisthesis of L5 on S1. Alignment is otherwise in range of normal. Vertebral body heights appear preserved. No acute fracture is identified. Disc space narrowing is seen at L5/S1. If further imaging evaluation is indicated, MRI could be considered.       As described.     This report was finalized on 12/18/2024 11:04 AM by Dr. Robbin Osuna M.D on Workstation: [id number]       Medications given in the ED:  Medications   dexAMETHasone sodium phosphate injection 10 mg (10 mg Intramuscular Given 12/18/24 1039)   methocarbamol (ROBAXIN) tablet 750 mg (750 mg Oral Given 12/18/24 1037)   acetaminophen (TYLENOL) tablet 1,000 mg (1,000 mg Oral Given 12/18/24 1036)       Orders placed during this visit:  Orders Placed This Encounter   Procedures    XR Spine Lumbar Complete 4+VW       Medical Decision Making:  ED Course as of 12/18/24 2247   Wed Dec 18, 2024   1141 Patient presentation and evaluation consistent with muscular low back pain.  No worrisome findings on today's evaluation including plain film imaging and physical exam to indicate the need for admission or further workup.  No worrisome deficits to indicate the need for neurologic consultation or neurosurgical consultation emergently.  Appropriate for outpatient management supportive care and close follow-up with PCP.  Patient agreeable with all questions answered. [DC]      ED Course User Index  [DC] Herb Ambrosio, PA       Differential diagnosis:  Spinal cord compression, cauda equina, radiculopathy      No red flags. Conservative treatment. Plan for discharge home with NSGY follow up as needed if not responding to active rest and PO medications.    Diagnosis  Final diagnoses:   Acute myofascial strain of lumbar region, initial encounter          Edmundo Gutierrez II, MD  12/18/24 3519

## (undated) DEVICE — TUBING, SUCTION, 1/4" X 10', STRAIGHT: Brand: MEDLINE

## (undated) DEVICE — CANN NASL CO2 TRULINK W/O2 A/

## (undated) DEVICE — Device: Brand: DEFENDO AIR/WATER/SUCTION AND BIOPSY VALVE

## (undated) DEVICE — THE TORRENT IRRIGATION SCOPE CONNECTOR IS USED WITH THE TORRENT IRRIGATION TUBING TO PROVIDE IRRIGATION FLUIDS SUCH AS STERILE WATER DURING GASTROINTESTINAL ENDOSCOPIC PROCEDURES WHEN USED IN CONJUNCTION WITH AN IRRIGATION PUMP (OR ELECTROSURGICAL UNIT).: Brand: TORRENT